# Patient Record
Sex: MALE | Race: WHITE | HISPANIC OR LATINO | Employment: UNEMPLOYED | URBAN - METROPOLITAN AREA
[De-identification: names, ages, dates, MRNs, and addresses within clinical notes are randomized per-mention and may not be internally consistent; named-entity substitution may affect disease eponyms.]

---

## 2023-06-18 ENCOUNTER — HOSPITAL ENCOUNTER (OUTPATIENT)
Dept: RADIOLOGY | Facility: MEDICAL CENTER | Age: 23
End: 2023-06-18
Attending: NURSE PRACTITIONER

## 2023-06-18 ENCOUNTER — OFFICE VISIT (OUTPATIENT)
Dept: URGENT CARE | Facility: PHYSICIAN GROUP | Age: 23
End: 2023-06-18

## 2023-06-18 VITALS
SYSTOLIC BLOOD PRESSURE: 122 MMHG | BODY MASS INDEX: 20.73 KG/M2 | DIASTOLIC BLOOD PRESSURE: 74 MMHG | TEMPERATURE: 98 F | HEIGHT: 66 IN | OXYGEN SATURATION: 98 % | HEART RATE: 77 BPM | WEIGHT: 129 LBS | RESPIRATION RATE: 18 BRPM

## 2023-06-18 DIAGNOSIS — Q66.91 CONGENITAL DEFORMITY OF FEET, UNSPECIFIED, RIGHT FOOT: ICD-10-CM

## 2023-06-18 DIAGNOSIS — M25.571 ACUTE RIGHT ANKLE PAIN: ICD-10-CM

## 2023-06-18 DIAGNOSIS — M79.671 ACUTE FOOT PAIN, RIGHT: ICD-10-CM

## 2023-06-18 PROCEDURE — 99203 OFFICE O/P NEW LOW 30 MIN: CPT | Performed by: NURSE PRACTITIONER

## 2023-06-18 PROCEDURE — 3078F DIAST BP <80 MM HG: CPT | Performed by: NURSE PRACTITIONER

## 2023-06-18 PROCEDURE — 3074F SYST BP LT 130 MM HG: CPT | Performed by: NURSE PRACTITIONER

## 2023-06-18 PROCEDURE — 73630 X-RAY EXAM OF FOOT: CPT | Mod: RT

## 2023-06-18 PROCEDURE — 73610 X-RAY EXAM OF ANKLE: CPT | Mod: RT

## 2023-06-18 RX ADMIN — Medication 600 MG: at 15:06

## 2023-06-18 NOTE — PROGRESS NOTES
"Subjective:     Joe Collado is a 22 y.o. male who presents for Ankle Pain (Right ankle)      Ankle Pain       Pt presents for evaluation of a new problem. Joe is a very pleasant 22-year-old male who presents to urgent care today along with both of his parents who are his primary historian.  He does also suffer from a developmental delay and is nonverbal.  Note that for the past 2 days he has been favoring his right foot and has not been bearing any weight.  He does endorse pain to right lateral ankle dorsal aspect of right foot as well as distal tibia/fibula.  No medication or supportive treatment has been given for his symptoms other than rest.  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     ROS    PMH:   Past Medical History:   Diagnosis Date    Development delay      ALLERGIES: No Known Allergies  SURGHX:   Past Surgical History:   Procedure Laterality Date    APPENDECTOMY  2010    OTHER CARDIAC SURGERY  2007    \"hole ine heart\" per mom     SOCHX:   Social History     Socioeconomic History    Marital status: Single   Tobacco Use    Smoking status: Never     FH: No family history on file.      Objective:   /74   Pulse 77   Temp 36.7 °C (98 °F)   Resp 18   Ht 1.676 m (5' 6\")   Wt 58.5 kg (129 lb)   SpO2 98%   BMI 20.82 kg/m²     Physical Exam  Vitals and nursing note reviewed.   Constitutional:       General: He is not in acute distress.     Appearance: Normal appearance. He is not ill-appearing.   HENT:      Head: Normocephalic and atraumatic.      Right Ear: External ear normal.      Left Ear: External ear normal.      Nose: No congestion or rhinorrhea.      Mouth/Throat:      Mouth: Mucous membranes are moist.   Eyes:      Extraocular Movements: Extraocular movements intact.      Pupils: Pupils are equal, round, and reactive to light.   Cardiovascular:      Rate and Rhythm: Normal rate and regular " rhythm.      Pulses: Normal pulses.      Heart sounds: Normal heart sounds.   Pulmonary:      Effort: Pulmonary effort is normal.      Breath sounds: Normal breath sounds.   Abdominal:      General: Abdomen is flat. Bowel sounds are normal.      Palpations: Abdomen is soft.   Musculoskeletal:      Cervical back: Normal range of motion and neck supple.      Right ankle: Deformity (congenital) present. No swelling or ecchymosis. Tenderness present over the lateral malleolus. Decreased range of motion.        Legs:    Skin:     General: Skin is warm and dry.      Capillary Refill: Capillary refill takes less than 2 seconds.   Neurological:      General: No focal deficit present.      Mental Status: He is alert and oriented to person, place, and time. Mental status is at baseline.   Psychiatric:         Mood and Affect: Mood normal.         Behavior: Behavior normal.         Thought Content: Thought content normal.         Judgment: Judgment normal.       DX-FOOT-COMPLETE 3+ RIGHT    Result Date: 6/18/2023 6/18/2023 2:22 PM HISTORY/REASON FOR EXAM:  Unknown trauma. Pt is non vebal. No weight bearing TECHNIQUE/EXAM DESCRIPTION AND NUMBER OF VIEWS: 3 views of the RIGHT foot. COMPARISON:  Right ankle x-ray 6/18/2023 FINDINGS: There is no fracture. There is subluxation at the talonavicular joint. This is probably chronic. Additionally, there is flattening of the midfoot. Additionally, there is abnormal gap between the navicular and the lateral cuneiform. No degenerative changes are present.     1.  Negative for fracture 2.  Midfoot abnormalities which are likely congenital/developmental    DX-ANKLE 3+ VIEWS RIGHT    Result Date: 6/18/2023 6/18/2023 2:22 PM HISTORY/REASON FOR EXAM:  Unknown trauma. Pt is non vebal. No weight bearing TECHNIQUE/EXAM DESCRIPTION AND NUMBER OF VIEWS: 3 views of the RIGHT foot. COMPARISON:  Right ankle x-ray 6/18/2023 FINDINGS: There is no fracture. There is subluxation at the talonavicular  joint. This is probably chronic. Additionally, there is flattening of the midfoot. Additionally, there is abnormal gap between the navicular and the lateral cuneiform. No degenerative changes are present.     1.  Negative for fracture 2.  Midfoot abnormalities which are likely congenital/developmental     Assessment/Plan:   Assessment    1. Acute right ankle pain  DX-ANKLE 3+ VIEWS RIGHT    DX-FOOT-COMPLETE 3+ RIGHT      2. Acute foot pain, right  DX-FOOT-COMPLETE 3+ RIGHT      X-ray results discussed with patient and family.  Due to congenital deformity and new onset foot pain he was referred to follow-up with podiatry.  Pt was encouraged to seek treatment back in urgent care for follow-up x-ray if pain remains persistent past 7 days or develops worsening pain/symptoms. Tylenol/Ibuprofen as needed for discomfort.  Pt was encouraged to rest, ice 20 minutes 3 times daily for 3 days, elevate at heart level and compress with Ace wrap. Ace wrap applied in clinic by MA. Gentle ROM exercises encouraged.  Questions/concerns addressed.  AVS handout given and reviewed with patient. Pt educated on red flags and when to seek treatment back in ER or UC.        AVS handout given and reviewed with patient. Pt educated on red flags and when to seek treatment back in ER or UC.

## 2024-11-18 ENCOUNTER — OFFICE VISIT (OUTPATIENT)
Dept: URGENT CARE | Facility: PHYSICIAN GROUP | Age: 24
End: 2024-11-18

## 2024-11-18 ENCOUNTER — HOSPITAL ENCOUNTER (OUTPATIENT)
Dept: RADIOLOGY | Facility: MEDICAL CENTER | Age: 24
End: 2024-11-18

## 2024-11-18 VITALS
SYSTOLIC BLOOD PRESSURE: 118 MMHG | RESPIRATION RATE: 16 BRPM | BODY MASS INDEX: 24.8 KG/M2 | TEMPERATURE: 98.6 F | OXYGEN SATURATION: 95 % | WEIGHT: 140 LBS | HEIGHT: 63 IN | DIASTOLIC BLOOD PRESSURE: 76 MMHG | HEART RATE: 97 BPM

## 2024-11-18 DIAGNOSIS — S93.401A SPRAIN OF RIGHT ANKLE, UNSPECIFIED LIGAMENT, INITIAL ENCOUNTER: Primary | ICD-10-CM

## 2024-11-18 DIAGNOSIS — M25.571 PAIN AND SWELLING OF RIGHT ANKLE: ICD-10-CM

## 2024-11-18 DIAGNOSIS — M25.471 PAIN AND SWELLING OF RIGHT ANKLE: ICD-10-CM

## 2024-11-18 DIAGNOSIS — Q66.51 PES PLANUS, CONGENITAL, RIGHT: ICD-10-CM

## 2024-11-18 PROCEDURE — 99213 OFFICE O/P EST LOW 20 MIN: CPT

## 2024-11-18 PROCEDURE — 3078F DIAST BP <80 MM HG: CPT

## 2024-11-18 PROCEDURE — 3074F SYST BP LT 130 MM HG: CPT

## 2024-11-18 PROCEDURE — 73610 X-RAY EXAM OF ANKLE: CPT | Mod: RT

## 2024-11-18 ASSESSMENT — ENCOUNTER SYMPTOMS
EYE REDNESS: 0
WHEEZING: 0
SORE THROAT: 0
EYE PAIN: 0
COUGH: 0
CHILLS: 0
NAUSEA: 0
PALPITATIONS: 0
VOMITING: 0
EYE DISCHARGE: 0
SHORTNESS OF BREATH: 0
STRIDOR: 0
ABDOMINAL PAIN: 0
DIZZINESS: 0
DIARRHEA: 0
FEVER: 0
MYALGIAS: 0
SPUTUM PRODUCTION: 0
HEADACHES: 0

## 2024-11-19 NOTE — PROGRESS NOTES
"Subjective:   Joe Collado is a 24 y.o. male who presents for Ankle Injury (R x 4 days with pain and swelling.)          I introduced myself to the patient and informed them that I am a Family Nurse Practitioner.    HPI:Joe is a 24 year-old male with developmental delay, non- verbally communicative who comes in today accompanied by his father and brother complaining of right ankle injury. Onset was 4 days ago.  Father states they did not witness a fall or injury.  Patient describes symptoms as constant. They describe the pain as sharp, aching. Aggravating factors include weightbearing, mobilizing. Relieving factors include rest, offloading. Treatments tried at home include Tylenol with some relief. They describe their symptoms as moderate.     Review of Systems   Constitutional:  Negative for chills, fever and malaise/fatigue.   HENT:  Negative for congestion, ear pain and sore throat.    Eyes:  Negative for pain, discharge and redness.   Respiratory:  Negative for cough, sputum production, shortness of breath, wheezing and stridor.    Cardiovascular:  Negative for chest pain and palpitations.   Gastrointestinal:  Negative for abdominal pain, diarrhea, nausea and vomiting.   Genitourinary:  Negative for dysuria.   Musculoskeletal:  Positive for joint pain. Negative for myalgias.        Positive for right ankle pain and instability   Skin:  Negative for rash.   Neurological:  Negative for dizziness and headaches.       Medications: LUNA SELTZER PLUS PO  polyethylene glycol 3350 Powd     Allergies: Patient has no known allergies.    Problem List: does not have any pertinent problems on file.    Surgical History:  Past Surgical History:   Procedure Laterality Date    APPENDECTOMY  2010    OTHER CARDIAC SURGERY  2007    \"hole ine heart\" per mom       Past Social Hx:   reports that he has never smoked. He has never used smokeless tobacco. He reports that he does not drink alcohol and does not use drugs. " "    Past Family Hx:   family history is not on file.     Problem list, medications, and allergies reviewed by myself today in Epic.   I have documented what I find to be significant in regards to past medical, social, family and surgical history  in my HPI or under PMH/PSH/FH review section, otherwise it is noncontributory     Objective:     /76   Pulse 97   Temp 37 °C (98.6 °F) (Temporal)   Resp 16   Ht 1.6 m (5' 3\")   Wt 63.5 kg (140 lb)   SpO2 95%   BMI 24.80 kg/m²     During this visit, appropriate PPE was worn, and hand hygiene was performed.    Physical Exam  Vitals reviewed.   Constitutional:       General: He is not in acute distress.     Appearance: Normal appearance. He is not ill-appearing or toxic-appearing.   HENT:      Head: Normocephalic and atraumatic.      Right Ear: External ear normal.      Left Ear: External ear normal.      Nose: No congestion or rhinorrhea.   Eyes:      General: No scleral icterus.        Right eye: No discharge.         Left eye: No discharge.      Extraocular Movements: Extraocular movements intact.      Conjunctiva/sclera: Conjunctivae normal.   Cardiovascular:      Rate and Rhythm: Normal rate.   Pulmonary:      Effort: Pulmonary effort is normal.   Abdominal:      General: There is no distension.      Palpations: Abdomen is soft.   Musculoskeletal:         General: Normal range of motion.      Cervical back: Normal range of motion. No rigidity.      Right lower leg: No edema.      Left lower leg: No edema.      Comments:  Patient is  not able to bear weight.  Mid calf tib/fib squeeze test is negative for sign of syndesmotic injury/high ankle sprain.  There is TTP over the anterior joint line  especially medially, and pain to range of motion especially to dorsiflexion, plantarflexion, and inversion, as well as some pain to external rotation of the ankle.  There are no obvious bony deformities or step-offs palpated.  There is TTP over the medial malleolus.   " There is no TTP over the base of the fifth metatarsal.   There is no navicular TTP.   There is no TTP to the distal 6 cm of the fibula.  Anterior ankle drawer test is negative, posterior drawer is negative.  There is no area of erythema or ecchymosis present, mild edema visible at the anterior joint line of the dorsal foot/ankle.  NVID with cap refill less than 3 seconds to distal digits, pedal pulses palpable at 1+ DP/PT, sensation intact to hard and soft to foot and distal digits.     Skin:     General: Skin is warm and dry.      Capillary Refill: Capillary refill takes 2 to 3 seconds.      Coloration: Skin is not jaundiced.   Neurological:      General: No focal deficit present.      Mental Status: He is alert and oriented to person, place, and time. Mental status is at baseline.      Gait: Gait normal.   Psychiatric:         Mood and Affect: Mood normal.         Behavior: Behavior normal.         Thought Content: Thought content normal.         Judgment: Judgment normal.             RADIOLOGY RESULTS   DX-ANKLE 3+ VIEWS RIGHT    Result Date: 11/18/2024 11/18/2024 6:03 PM HISTORY/REASON FOR EXAM:  Atraumatic Pain/Swelling/Deformity; TTP over medial malleolus, swelling. TECHNIQUE/EXAM DESCRIPTION AND NUMBER OF VIEWS:  3 views of the  RIGHT ankle. COMPARISON: 6/18/2023 FINDINGS: MINERALIZATION: Mineralization is unremarkable for age. INJURY: No acute fracture or gross malalignment is seen. JOINTS: Pes planus with increased collapse of the midfoot from prior.     1.  Pes planus with increased collapse of the midfoot from prior, likely progression of congenital/developmental abnormalities seen on prior exam. 2.  No radiographic evidence of acute fracture, though suboptimally evaluated given anatomy.          Assessment/Plan:     Diagnosis and associated orders:     1. Sprain of right ankle, unspecified ligament, initial encounter  Referral to Sports Medicine      2. Pain and swelling of right ankle  DX-ANKLE 3+  VIEWS RIGHT    Referral to Sports Medicine      3. Pes planus, congenital, right           Comments/MDM:     1. Pain and swelling of right ankle  - DX-ANKLE 3+ VIEWS RIGHT; Future  - Referral to Sports Medicine    2. Sprain of right ankle, unspecified ligament, initial encounter (Primary)  Discussed with patient and his father and brother, Dx /ankle sprain,  Discussed possible  DDx, management options (risks,benefits, and alternatives to planned treatment), natural progression.  I did go over the x-rays with patient's family and let him know there are no signs of any fractures  Supportive care measures were discussed including the following -   RICE  Tylenol and ibuprofen for pain and inflammation, discussed appropriate dosages.  Ace wrap to ankle, rewrap as needed  Tall orthopedic walking boot, wear 24/7 especially anytime you are weightbearing or mobilizing.  May remove for bathing  WC Rx given, information given regarding care chest, address and phone number, instructed them to go there tomorrow with the prescription and see if they have a wheelchair available.  If they do not then call the clinic and leave a message we will order one from a "[employer]," however they might have to pay for cost.  Patient is not insured and is self-pay at this time.    WBAT until you follow-up with  Sports Medicine  Instructed patient ongoing assessment of foot and ankle for color, movement, sensation  Discussed red flags and reasons return to urgent care versus when to go to the emergency room for increased pain, loss of color, movement, or sensation to the foot or toes, inability to bear weight, any increased redness, swelling, localized heat, especially if there is fever, chills, nausea and vomiting, lethargy.    discussed with patient  that I have made a referral to  Sports Medicine for follow-up, encouraged mother to call to make an appointment, phone number and location provided    - Referral to Sports Medicine    3. Pes  planus, congenital, right         Pt is clinically stable at today's acute urgent care visit. Vital signs are normal and reassuring.  No acute distress noted. Appropriate for outpatient management at this time.        I personally reviewed prior external notes and test results pertinent to today's visit.  I have independently reviewed and interpreted all diagnostics ordered during this urgent care acute visit.        Please note that this dictation was created using voice recognition software. I have made a reasonable attempt to correct obvious errors, but I expect that there are errors of grammar and possibly content that I did not discover before finalizing the note.    This note was electronically signed by Daniele POLANCO, ALFREDO, TAWANDA, MIKE